# Patient Record
Sex: MALE | Race: BLACK OR AFRICAN AMERICAN | NOT HISPANIC OR LATINO | Employment: FULL TIME | URBAN - METROPOLITAN AREA
[De-identification: names, ages, dates, MRNs, and addresses within clinical notes are randomized per-mention and may not be internally consistent; named-entity substitution may affect disease eponyms.]

---

## 2020-01-04 ENCOUNTER — OFFICE VISIT (OUTPATIENT)
Dept: URGENT CARE | Age: 35
End: 2020-01-04
Payer: COMMERCIAL

## 2020-01-04 VITALS
TEMPERATURE: 98.5 F | OXYGEN SATURATION: 96 % | SYSTOLIC BLOOD PRESSURE: 102 MMHG | DIASTOLIC BLOOD PRESSURE: 60 MMHG | HEART RATE: 92 BPM | RESPIRATION RATE: 18 BRPM | WEIGHT: 199.6 LBS

## 2020-01-04 DIAGNOSIS — J02.9 SORE THROAT: Primary | ICD-10-CM

## 2020-01-04 LAB — S PYO AG THROAT QL: NEGATIVE

## 2020-01-04 PROCEDURE — 99203 OFFICE O/P NEW LOW 30 MIN: CPT | Performed by: PHYSICIAN ASSISTANT

## 2020-01-04 PROCEDURE — 87631 RESP VIRUS 3-5 TARGETS: CPT | Performed by: PHYSICIAN ASSISTANT

## 2020-01-04 PROCEDURE — 87070 CULTURE OTHR SPECIMN AEROBIC: CPT | Performed by: PHYSICIAN ASSISTANT

## 2020-01-04 PROCEDURE — 87880 STREP A ASSAY W/OPTIC: CPT | Performed by: PHYSICIAN ASSISTANT

## 2020-01-04 NOTE — PATIENT INSTRUCTIONS
Continue to monitor symptoms  Drink plenty of fluids  Use over the counter Tylenol or Ibuprofen for fever and pain relief  If new or worsening symptoms develop, go immediately to the Er  Follow up with family doctor this week  Influenza   WHAT YOU NEED TO KNOW:   Influenza (the flu) is an infection caused by the influenza virus  The flu is easily spread when an infected person coughs, sneezes, or has close contact with others  You may be able to spread the flu to others for 1 week or longer after signs or symptoms appear  DISCHARGE INSTRUCTIONS:   Call 911 for any of the following:   · You have trouble breathing, and your lips look purple or blue  · You have a seizure  Return to the emergency department if:   · You are dizzy, or you are urinating less or not at all  · You have a headache with a stiff neck, and you feel tired or confused  · You have new pain or pressure in your chest     · Your symptoms, such as shortness of breath, vomiting, or diarrhea, get worse  · Your symptoms, such as fever and coughing, seem to get better, but then get worse  Contact your healthcare provider if:   · You have new muscle pain or weakness  · You have questions or concerns about your condition or care  Medicines: You may need any of the following:  · Acetaminophen  decreases pain and fever  It is available without a doctor's order  Ask how much to take and how often to take it  Follow directions  Acetaminophen can cause liver damage if not taken correctly  · NSAIDs , such as ibuprofen, help decrease swelling, pain, and fever  This medicine is available with or without a doctor's order  NSAIDs can cause stomach bleeding or kidney problems in certain people  If you take blood thinner medicine, always ask your healthcare provider if NSAIDs are safe for you  Always read the medicine label and follow directions  · Antivirals  help fight a viral infection  · Take your medicine as directed    Contact your healthcare provider if you think your medicine is not helping or if you have side effects  Tell him or her if you are allergic to any medicine  Keep a list of the medicines, vitamins, and herbs you take  Include the amounts, and when and why you take them  Bring the list or the pill bottles to follow-up visits  Carry your medicine list with you in case of an emergency  Rest  as much as you can to help you recover  Drink liquids as directed  to help prevent dehydration  Ask how much liquid to drink each day and which liquids are best for you  Prevent the spread of influenza:   · Wash your hands often  Use soap and water  Wash your hands after you use the bathroom, change a child's diapers, or sneeze  Wash your hands before you prepare or eat food  Use gel hand cleanser when soap and water are not available  Do not touch your eyes, nose, or mouth unless you have washed your hands first            · Cover your mouth when you sneeze or cough  Cough into a tissue or the bend of your arm  · Clean shared items with a germ-killing   Clean table surfaces, doorknobs, and light switches  Do not share towels, silverware, and dishes with people who are sick  Wash bed sheets, towels, silverware, and dishes with soap and water  · Wear a mask  over your mouth and nose if you are sick or are near anyone who is sick  · Stay away from others  if you are sick  · Influenza vaccine  helps prevent influenza (flu)  Everyone older than 6 months should get a yearly influenza vaccine  Get the vaccine as soon as it is available, usually in September or October each year  Follow up with your healthcare provider as directed:  Write down your questions so you remember to ask them during your visits  © 2017 [employer] Information is for End User's use only and may not be sold, redistributed or otherwise used for commercial purposes   All illustrations and images included in CareNotes® are the copyrighted property of [employer]  or [employer] Oden  The above information is an  only  It is not intended as medical advice for individual conditions or treatments  Talk to your doctor, nurse or pharmacist before following any medical regimen to see if it is safe and effective for you

## 2020-01-04 NOTE — LETTER
January 4, 2020     Patient: Ritu Michael   YOB: 1985   Date of Visit: 1/4/2020       To Whom It May Concern: It is my medical opinion that Mary Chavira may return to work on 1/6/2019  If you have any questions or concerns, please don't hesitate to call           Sincerely,        Yordy Do PA-C    CC: No Recipients

## 2020-01-04 NOTE — PROGRESS NOTES
[facility]        NAME: Timmothy Hamman is a 36 y o  male  : 1985    MRN: [id number]  DATE: 2020  TIME: 4:57 PM    Assessment and Plan   Sore throat [J02 9]  1  Sore throat  POCT rapid strepA    Throat culture    Influenza A/B and RSV by PCR         Patient Instructions       Continue to monitor symptoms  Drink plenty of fluids  Use over the counter Tylenol or Ibuprofen for fever and pain relief  If new or worsening symptoms develop, go immediately to the Er  Follow up with family doctor this week  Chief Complaint     Chief Complaint   Patient presents with    Cold Like Symptoms     Flu like symptoms started about 2 days ago, body aches/pain, poor sleep, cough, fever and chills (worse at night), sore throat  Had flu vac this year  OTC meds include APAP, ibuprofen, otc flu remedy, and teas  History of Present Illness       Fever   This is a new problem  Episode onset: 2+ days ago  The problem occurs constantly  The problem has been unchanged  Associated symptoms include coughing, fatigue, a fever and myalgias  Pertinent negatives include no abdominal pain, chest pain, chills, congestion, diaphoresis, headaches, nausea, neck pain, numbness, rash, sore throat, swollen glands, vomiting or weakness  Associated symptoms comments: Decreased PO foods, drinking normal fluids  Nothing aggravates the symptoms  He has tried acetaminophen for the symptoms  The treatment provided mild relief  No known sick contacts  Pt had flu shot this year  Biggest complaint is fatigue and body aches  Review of Systems   Review of Systems   Constitutional: Positive for fatigue and fever  Negative for chills and diaphoresis  HENT: Positive for postnasal drip, sinus pressure and sinus pain  Negative for congestion, ear pain, rhinorrhea, sneezing, sore throat and trouble swallowing  Eyes: Negative  Respiratory: Positive for cough   Negative for chest tightness, shortness of breath and wheezing  Cardiovascular: Negative  Negative for chest pain and palpitations  Gastrointestinal: Negative for abdominal pain, diarrhea, nausea and vomiting  Endocrine: Negative  Genitourinary: Negative for dysuria  Musculoskeletal: Positive for myalgias  Negative for back pain and neck pain  Skin: Negative for pallor and rash  Allergic/Immunologic: Negative  Neurological: Negative  Negative for weakness, light-headedness, numbness and headaches  Hematological: Negative  Psychiatric/Behavioral: Negative  Current Medications     No current outpatient medications on file  Current Allergies     Allergies as of 01/04/2020    (No Known Allergies)            The following portions of the patient's history were reviewed and updated as appropriate: allergies, current medications, past family history, past medical history, past social history, past surgical history and problem list      History reviewed  No pertinent past medical history  History reviewed  No pertinent surgical history  Family History   Problem Relation Age of Onset    Lupus Mother          Medications have been verified  Objective   /60   Pulse 92   Temp 98 5 °F (36 9 °C) (Tympanic)   Resp 18   Wt 90 5 kg (199 lb 9 6 oz)   SpO2 96%        Physical Exam     Physical Exam   Constitutional: He appears well-developed and well-nourished  No distress  Appears fatigued but well   HENT:   Head: Normocephalic and atraumatic  Right Ear: Hearing, tympanic membrane, external ear and ear canal normal    Left Ear: Hearing, tympanic membrane, external ear and ear canal normal    Nose: Mucosal edema present  Right sinus exhibits no maxillary sinus tenderness and no frontal sinus tenderness  Left sinus exhibits no maxillary sinus tenderness and no frontal sinus tenderness  Mouth/Throat: Posterior oropharyngeal erythema (PND) present  No oropharyngeal exudate or posterior oropharyngeal edema  Eyes: Conjunctivae are normal  Right eye exhibits no discharge  Left eye exhibits no discharge  Neck: Normal range of motion  Neck supple  Cardiovascular: Normal rate, regular rhythm, normal heart sounds and intact distal pulses  Pulmonary/Chest: Effort normal and breath sounds normal  No respiratory distress  He has no wheezes  He has no rales  Lymphadenopathy:     He has no cervical adenopathy  Skin: Skin is warm  Capillary refill takes less than 2 seconds  No rash noted  He is not diaphoretic  No pallor  Nursing note and vitals reviewed

## 2020-01-05 ENCOUNTER — TELEPHONE (OUTPATIENT)
Dept: URGENT CARE | Age: 35
End: 2020-01-05

## 2020-01-05 LAB
FLUAV RNA NPH QL NAA+PROBE: ABNORMAL
FLUBV RNA NPH QL NAA+PROBE: DETECTED
RSV RNA NPH QL NAA+PROBE: ABNORMAL

## 2020-01-05 NOTE — TELEPHONE ENCOUNTER
Spoke with patient about (+) influenza  Pt outside of window for tamiflu  I talked to pt about fever control and oral rehydration    Pt states he understands

## 2020-01-06 LAB — BACTERIA THROAT CULT: NORMAL

## 2020-03-28 ENCOUNTER — NURSE TRIAGE (OUTPATIENT)
Dept: OTHER | Facility: OTHER | Age: 35
End: 2020-03-28

## 2020-03-28 NOTE — TELEPHONE ENCOUNTER
Reason for Disposition   [1] Fever AND [2] no signs of serious infection or localizing symptoms (all other triage questions negative)    Answer Assessment - Initial Assessment Questions  1  TEMPERATURE: "What is the most recent temperature?"  "How was it measured?"       Patient states having fevers, chills and night sweats  States does not have a thermometer  2  ONSET: "When did the fever start?"       Yesterday     3  SYMPTOMS: "Do you have any other symptoms besides the fever?"  (e g , colds, headache, sore throat, earache, cough, rash, diarrhea, vomiting, abdominal pain)      Headache, cough, body aches  4  CAUSE: If there are no symptoms, ask: "What do you think is causing the fever?"       Unknown     5  CONTACTS: "Does anyone else in the family have an infection?"      Denies  States works at a [employer]  6  TREATMENT: "What have you done so far to treat this fever?" (e g , medications)      Tylenol  7  IMMUNOCOMPROMISE: "Do you have of the following: diabetes, HIV positive, splenectomy, cancer chemotherapy, chronic steroid treatment, transplant patient, etc "      Denies     9  TRAVEL: "Have you traveled out of the country in the last month?" (e g , travel history, exposures)      Denies    Protocols used:  FEVER-ADULT-AH

## 2020-03-28 NOTE — TELEPHONE ENCOUNTER
Regarding: patterson   ----- Message from Glenn Navas sent at 3/28/2020 12:06 PM EDT -----  I have fever, body ache, headache cough, ( no thermometer)   No pcp

## 2022-08-11 ENCOUNTER — APPOINTMENT (OUTPATIENT)
Dept: URGENT CARE | Age: 37
End: 2022-08-11

## 2023-02-12 ENCOUNTER — OFFICE VISIT (OUTPATIENT)
Dept: URGENT CARE | Age: 38
End: 2023-02-12

## 2023-02-12 VITALS
TEMPERATURE: 98.3 F | BODY MASS INDEX: 30.31 KG/M2 | HEART RATE: 84 BPM | HEIGHT: 68 IN | RESPIRATION RATE: 18 BRPM | WEIGHT: 200 LBS | OXYGEN SATURATION: 98 %

## 2023-02-12 DIAGNOSIS — H61.23 BILATERAL IMPACTED CERUMEN: Primary | ICD-10-CM

## 2023-02-12 NOTE — PROGRESS NOTES
[facility]        NAME: Anya Jack is a 40 y o  male  : 1985    MRN: [id number]  DATE: 2023  TIME: 3:19 PM    Assessment and Plan   Bilateral impacted cerumen [H61 23]  1  Bilateral impacted cerumen          Patient presents with c/o right ear pain  States wears a headset for work everyday  Denies fevers, congestion and other symptoms    Assessment notes b/l wax impaction  Right ear impact removed successfully with lavage  Left ear impaction removed some not all of impaction  D/T risk of complication with continued flushing/curette use: advised to attempt debrox  F/U as needed  Patient Instructions       Follow up with PCP as needed    Chief Complaint     Chief Complaint   Patient presents with   • Earache     Right ear pain x 5 daYS         History of Present Illness       Patient presents with c/o right ear pain  States wears a headset for work everyday  Denies fevers, congestion and other symptoms    Assessment notes b/l wax impaction  Right ear impact removed successfully with lavage  Left ear impaction removed some not all of impaction  D/T risk of complication with continued flushing/curette use: advised to attempt debrox  F/U as needed  Review of Systems   Review of Systems   Constitutional: Negative for activity change  HENT: Positive for ear pain and hearing loss  Negative for congestion  Respiratory: Negative for cough  All other systems reviewed and are negative  Current Medications     No current outpatient medications on file  Current Allergies     Allergies as of 2023   • (No Known Allergies)            The following portions of the patient's history were reviewed and updated as appropriate: allergies, current medications, past family history, past medical history, past social history, past surgical history and problem list      History reviewed  No pertinent past medical history  History reviewed   No pertinent surgical history  Family History   Problem Relation Age of Onset   • Lupus Mother          Medications have been verified  Objective   Pulse 84   Temp 98 3 °F (36 8 °C)   Resp 18   Ht 5' 8" (1 727 m)   Wt 90 7 kg (200 lb)   SpO2 98%   BMI 30 41 kg/m²   No LMP for male patient  Physical Exam     Physical Exam  Vitals reviewed  HENT:      Right Ear: There is impacted cerumen  Left Ear: There is impacted cerumen  Nose: No congestion or rhinorrhea  Pulmonary:      Effort: Pulmonary effort is normal    Neurological:      Mental Status: He is alert  Ear cerumen removal    Date/Time: 2/12/2023 3:18 PM  Performed by: DASHA Gates  Authorized by: DASHA Gates   Universal Protocol:  Consent: Verbal consent obtained  Consent given by: patient  Patient understanding: patient states understanding of the procedure being performed  Patient identity confirmed: verbally with patient      Patient location:  Bedside  Procedure details:     Local anesthetic:  None    Location:  L ear and R ear    Procedure type: irrigation with instrumentation      Instrumentation: curette      Approach:  External  Post-procedure details:     Complication:  None    Hearing quality:  Improved    Patient tolerance of procedure:   Tolerated well, no immediate complications  Comments:      Right ear impaction completely resolved, left ear has small amount impaction remaining